# Patient Record
Sex: FEMALE | Race: WHITE | NOT HISPANIC OR LATINO | URBAN - METROPOLITAN AREA
[De-identification: names, ages, dates, MRNs, and addresses within clinical notes are randomized per-mention and may not be internally consistent; named-entity substitution may affect disease eponyms.]

---

## 2021-10-21 ENCOUNTER — OFFICE VISIT (OUTPATIENT)
Dept: URBAN - METROPOLITAN AREA CLINIC 63 | Facility: CLINIC | Age: 41
End: 2021-10-21

## 2021-11-08 ENCOUNTER — OFFICE VISIT (OUTPATIENT)
Dept: URBAN - METROPOLITAN AREA MEDICAL CENTER 15 | Facility: MEDICAL CENTER | Age: 41
End: 2021-11-08

## 2021-11-22 ENCOUNTER — OFFICE VISIT (OUTPATIENT)
Dept: URBAN - METROPOLITAN AREA CLINIC 63 | Facility: CLINIC | Age: 41
End: 2021-11-22

## 2021-12-07 ENCOUNTER — OFFICE VISIT (OUTPATIENT)
Dept: URBAN - METROPOLITAN AREA MEDICAL CENTER 14 | Facility: MEDICAL CENTER | Age: 41
End: 2021-12-07

## 2022-01-03 ENCOUNTER — OFFICE VISIT (OUTPATIENT)
Dept: URBAN - METROPOLITAN AREA MEDICAL CENTER 15 | Facility: MEDICAL CENTER | Age: 42
End: 2022-01-03

## 2022-01-17 ENCOUNTER — OFFICE VISIT (OUTPATIENT)
Dept: URBAN - METROPOLITAN AREA CLINIC 63 | Facility: CLINIC | Age: 42
End: 2022-01-17

## 2022-07-09 ENCOUNTER — TELEPHONE ENCOUNTER (OUTPATIENT)
Dept: URBAN - METROPOLITAN AREA CLINIC 121 | Facility: CLINIC | Age: 42
End: 2022-07-09

## 2022-07-09 RX ORDER — ALBUTEROL SULFATE 90 UG/1
INHALANT RESPIRATORY (INHALATION)
Refills: 0 | OUTPATIENT
Start: 2021-08-13 | End: 2021-10-21

## 2022-07-09 RX ORDER — OMEPRAZOLE 40 MG/1
CAPSULE, DELAYED RELEASE ORAL
Refills: 0 | OUTPATIENT
Start: 2021-10-20 | End: 2021-10-21

## 2022-07-10 ENCOUNTER — TELEPHONE ENCOUNTER (OUTPATIENT)
Dept: URBAN - METROPOLITAN AREA CLINIC 121 | Facility: CLINIC | Age: 42
End: 2022-07-10

## 2022-07-10 RX ORDER — OMEPRAZOLE 40 MG/1
CAPSULE, DELAYED RELEASE ORAL ONCE A DAY
Refills: 0 | Status: ACTIVE | COMMUNITY
Start: 2021-10-21

## 2022-07-10 RX ORDER — ALBUTEROL SULFATE 90 UG/1
INHALANT RESPIRATORY (INHALATION)
Refills: 0 | Status: ACTIVE | COMMUNITY
Start: 2021-10-21

## 2023-12-14 ENCOUNTER — OFFICE VISIT (OUTPATIENT)
Dept: URBAN - METROPOLITAN AREA CLINIC 63 | Facility: CLINIC | Age: 43
End: 2023-12-14

## 2024-01-12 ENCOUNTER — DASHBOARD ENCOUNTERS (OUTPATIENT)
Age: 44
End: 2024-01-12

## 2024-01-12 ENCOUNTER — OFFICE VISIT (OUTPATIENT)
Dept: URBAN - METROPOLITAN AREA CLINIC 63 | Facility: CLINIC | Age: 44
End: 2024-01-12
Payer: COMMERCIAL

## 2024-01-12 VITALS
HEIGHT: 62 IN | OXYGEN SATURATION: 96 % | HEART RATE: 77 BPM | DIASTOLIC BLOOD PRESSURE: 60 MMHG | TEMPERATURE: 97 F | WEIGHT: 264 LBS | BODY MASS INDEX: 48.58 KG/M2 | SYSTOLIC BLOOD PRESSURE: 110 MMHG

## 2024-01-12 DIAGNOSIS — K64.9 BLEEDING HEMORRHOIDS: ICD-10-CM

## 2024-01-12 PROCEDURE — 99214 OFFICE O/P EST MOD 30 MIN: CPT | Performed by: NURSE PRACTITIONER

## 2024-01-12 RX ORDER — SERTRALINE 100 MG/1
1 TABLET TABLET, FILM COATED ORAL ONCE A DAY
Status: ACTIVE | COMMUNITY

## 2024-01-12 RX ORDER — HYDROCORTISONE ACETATE PRAMOXINE HCL 1; 1 G/100G; G/100G
1 APPLICATION CREAM TOPICAL
Qty: 1 | Refills: 2 | OUTPATIENT
Start: 2024-01-12 | End: 2024-04-11

## 2024-01-12 NOTE — HPI-TODAY'S VISIT:
Thank you very much for kindly referring Rajeev Naqvi, a very pleasant 43-year-old female patient of Dr. Najera seen urgently at patient's request in hospital follow-up and for rectal pain.  On 04 January 2024, she was discharged from [facility] with a admission diagnosis of headache and neck pain.  She was given analgesics and muscle relaxants which resulted in constipation.  She did not have a bowel movement for approximately 5 days after and then after a considerable amount of straining, she had a large bowel movement with blood.  She presents today complaining of continued rectal discomfort and concern over the blood in her stool.  She is very anxious about it and is quite concerned about rectal cancer.  Her last EGD and complete colonoscopy was 10 December 2021 (Julio Najera, DO) which was significant for a 5 mm benign polyp removed from the sigmoid colon and grade 2 internal hemorrhoids.  She is otherwise asymptomatic from a GI perspective and is just quite anxious about the blood per rectum.  She denies pyrosis, dysphagia, dyspepsia, abdominal pain, hematochezia, melena or unintentional weight loss.

## 2024-01-12 NOTE — HPI-PREVIOUS PROCEDURES
Colonoscopy/07 December 2021 (Julio Najera DO).  5 mm tubular adenomatous polyp removed from the sigmoid colon.  Left-sided diverticulosis and internal hemorrhoids.

## 2024-01-12 NOTE — HPI-PREVIOUS IMAGING
Q ultrasound/25 November 2023. 1. Cholelithiasis with no sonographic evidence for acute cholecystitis. 2. Hepatomegaly and hepatic steatosis.

## 2024-01-12 NOTE — HPI-PREVIOUS LABS
Lab work dated 31 December 2023 demonstrates the following abnormalities: Absolute monocytes 0.9, anion gap 6, glucose 131, CRP 4.6.  All remaining lab values of CBC, CMP, sed rate and hemoglobin A1c are within normal limits.

## 2025-02-24 ENCOUNTER — OFFICE VISIT (OUTPATIENT)
Dept: URBAN - METROPOLITAN AREA CLINIC 63 | Facility: CLINIC | Age: 45
End: 2025-02-24
Payer: COMMERCIAL

## 2025-02-24 ENCOUNTER — TELEPHONE ENCOUNTER (OUTPATIENT)
Dept: URBAN - METROPOLITAN AREA CLINIC 63 | Facility: CLINIC | Age: 45
End: 2025-02-24

## 2025-02-24 VITALS
OXYGEN SATURATION: 96 % | HEART RATE: 73 BPM | BODY MASS INDEX: 50.05 KG/M2 | WEIGHT: 272 LBS | DIASTOLIC BLOOD PRESSURE: 77 MMHG | SYSTOLIC BLOOD PRESSURE: 98 MMHG | HEIGHT: 62 IN | TEMPERATURE: 98.6 F

## 2025-02-24 DIAGNOSIS — Z86.0101 PERSONAL HISTORY OF ADENOMATOUS AND SERRATED COLON POLYPS: ICD-10-CM

## 2025-02-24 DIAGNOSIS — K62.5 RECTAL BLEEDING: ICD-10-CM

## 2025-02-24 DIAGNOSIS — R19.7 ACUTE DIARRHEA: ICD-10-CM

## 2025-02-24 PROCEDURE — 99214 OFFICE O/P EST MOD 30 MIN: CPT | Performed by: PHYSICIAN ASSISTANT

## 2025-02-24 RX ORDER — SERTRALINE 100 MG/1
1 TABLET TABLET, FILM COATED ORAL ONCE A DAY
Status: ACTIVE | COMMUNITY

## 2025-02-24 NOTE — HPI-TODAY'S VISIT:
44-year-old female, former patient of Dr. Najera presents to the office for follow up after ED visit 2/2/25 with rectal bleeding.   She presented to the ER at [facility] on February 20, 2025 with complaint of rectal bleeding. She reported passing bright red blood and clots over 1 day. She reported lower abdominal and lower back pain and was using a heating pad. She also reported having vaginal bleeding for several months but that had resolved. Her CBC was unremarkable with hemoglobin 15.2 and platelet count 206. CMP was normal. CT scan of the abdomen and pelvis without contrast showed no acute abnormality. She was advised to follow-up with GI in the outpatient setting for colonoscopy.  She follows up today reporting low back pain and low abodminal pain which have been going on for a few weeks. She has a lot of nausea without vomiting. She has frequent vaginal bleeding. She had ablation done 2 years ago and had no vaginal bleeding until recently. She has follow up with her GYN later today. She has diarrhea for about 1-2 weeks. She has diarrhea 2-3 times per day. Before diarrhea started, she had regular bowel habits. No other GI complaints.   She was last seen in the office in January 2024 with constipation and rectal bleeding.  She was prescribed hemorrhoidal cream to use for 10 days.  EGD 12/7/2021 to assess abdominal pain and anemia:Irregular Z-line.  Distal esophageal biopsy was negative for intestinal metaplasia.  Gastritis.  Gastric biopsy was negative for H. pylori.  There were a few benign-appearing gastric polyps, largest measuring up to 5 mm.  Duodenal biopsy showed no significant pathologic findings.   Colonoscopy 12/7/2021 to assess abdominal pain and anemia:A 5 mm tubular adenoma was removed from the sigmoid colon.  Additional findings included sigmoid diverticulosis and grade 2 internal and external hemorrhoids.